# Patient Record
Sex: FEMALE | Race: WHITE | NOT HISPANIC OR LATINO | Employment: UNEMPLOYED | URBAN - METROPOLITAN AREA
[De-identification: names, ages, dates, MRNs, and addresses within clinical notes are randomized per-mention and may not be internally consistent; named-entity substitution may affect disease eponyms.]

---

## 2024-04-23 ENCOUNTER — HOSPITAL ENCOUNTER (EMERGENCY)
Facility: HOSPITAL | Age: 44
Discharge: HOME | End: 2024-04-23
Attending: EMERGENCY MEDICINE
Payer: COMMERCIAL

## 2024-04-23 ENCOUNTER — APPOINTMENT (EMERGENCY)
Dept: RADIOLOGY | Facility: HOSPITAL | Age: 44
End: 2024-04-23
Payer: COMMERCIAL

## 2024-04-23 VITALS
HEIGHT: 67 IN | DIASTOLIC BLOOD PRESSURE: 87 MMHG | RESPIRATION RATE: 18 BRPM | BODY MASS INDEX: 27.68 KG/M2 | SYSTOLIC BLOOD PRESSURE: 145 MMHG | WEIGHT: 176.37 LBS | OXYGEN SATURATION: 97 % | TEMPERATURE: 98.5 F | HEART RATE: 90 BPM

## 2024-04-23 DIAGNOSIS — T07.XXXA MULTIPLE ABRASIONS: ICD-10-CM

## 2024-04-23 DIAGNOSIS — M25.521 RIGHT ELBOW PAIN: ICD-10-CM

## 2024-04-23 DIAGNOSIS — S43.014A CLOSED ANTERIOR DISLOCATION OF RIGHT SHOULDER, INITIAL ENCOUNTER: Primary | ICD-10-CM
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
## 2024-04-23 PROCEDURE — 96372 THER/PROPH/DIAG INJ SC/IM: CPT

## 2024-04-23 PROCEDURE — 73020 X-RAY EXAM OF SHOULDER: CPT

## 2024-04-23 PROCEDURE — 99283 EMERGENCY DEPT VISIT LOW MDM: CPT

## 2024-04-23 PROCEDURE — 73030 X-RAY EXAM OF SHOULDER: CPT

## 2024-04-23 PROCEDURE — 73080 X-RAY EXAM OF ELBOW: CPT

## 2024-04-23 PROCEDURE — 23650 CLTX SHO DSLC W/MNPJ WO ANES: CPT | Performed by: EMERGENCY MEDICINE

## 2024-04-23 PROCEDURE — 99285 EMERGENCY DEPT VISIT HI MDM: CPT | Performed by: EMERGENCY MEDICINE

## 2024-04-23 RX ORDER — LORAZEPAM 1 MG/1
1 TABLET ORAL ONCE
Status: COMPLETED | OUTPATIENT
Start: 2024-04-23 | End: 2024-04-23

## 2024-04-23 RX ORDER — OXYCODONE HYDROCHLORIDE 5 MG/1
5 TABLET ORAL ONCE
Status: DISCONTINUED | OUTPATIENT
Start: 2024-04-23 | End: 2024-04-23

## 2024-04-23 RX ORDER — FENTANYL CITRATE 50 UG/ML
80 INJECTION, SOLUTION INTRAMUSCULAR; INTRAVENOUS ONCE
Status: DISCONTINUED | OUTPATIENT
Start: 2024-04-23 | End: 2024-04-23

## 2024-04-23 RX ORDER — ACETAMINOPHEN 325 MG/1
650 TABLET ORAL ONCE
Status: COMPLETED | OUTPATIENT
Start: 2024-04-23 | End: 2024-04-23

## 2024-04-23 RX ORDER — FENTANYL CITRATE 50 UG/ML
INJECTION, SOLUTION INTRAMUSCULAR; INTRAVENOUS ONCE
Status: CANCELLED | OUTPATIENT
Start: 2024-04-23 | End: 2024-04-23

## 2024-04-23 RX ORDER — IBUPROFEN 400 MG/1
400 TABLET ORAL ONCE
Status: COMPLETED | OUTPATIENT
Start: 2024-04-23 | End: 2024-04-23

## 2024-04-23 RX ORDER — FENTANYL CITRATE 50 UG/ML
80 INJECTION, SOLUTION INTRAMUSCULAR; INTRAVENOUS ONCE
Status: COMPLETED | OUTPATIENT
Start: 2024-04-23 | End: 2024-04-23

## 2024-04-23 RX ADMIN — FENTANYL CITRATE 80 MCG: 50 INJECTION INTRAMUSCULAR; INTRAVENOUS at 10:59

## 2024-04-23 RX ADMIN — IBUPROFEN 400 MG: 400 TABLET, FILM COATED ORAL at 09:21

## 2024-04-23 RX ADMIN — ACETAMINOPHEN 650 MG: 325 TABLET ORAL at 09:21

## 2024-04-23 RX ADMIN — LORAZEPAM 1 MG: 1 TABLET ORAL at 10:06

## 2024-04-23 NOTE — ED ATTENDING ATTESTATION
4/23/2024  I, Reggie Pierce MD, saw and evaluated the patient. I have discussed the patient with the resident/non-physician practitioner and agree with the resident's/non-physician practitioner's findings, Plan of Care, and MDM as documented in the resident's/non-physician practitioner's note, except where noted. All available labs and Radiology studies were reviewed.  I was present for key portions of any procedure(s) performed by the resident/non-physician practitioner and I was immediately available to provide assistance.       At this point I agree with the current assessment done in the Emergency Department.  I have conducted an independent evaluation of this patient a history and physical is as follows:    Patient is a 43-year-old female presents after mechanical fall with right shoulder right elbow pain.  Otherwise no complaints.  Patient with decreased range of motion significant tenderness to the right shoulder, x-rays reviewed and shows dislocation.  After 2 attempts of reducing the shoulder, we were eventually successful patient was placed in a sling.  Advised outpatient orthopedic follow-up.    ED Course         Critical Care Time  Procedures

## 2024-04-23 NOTE — DISCHARGE INSTRUCTIONS
Keep your right arm in a sling until you follow-up with the orthopedic office in approximately 1 week.

## 2024-04-23 NOTE — ED PROVIDER NOTES
History  Chief Complaint   Patient presents with    Fall     Landing on right side injuring right arm.   Complaints of shoulder, elbow, and wrist     43-year-old female presents with acute right shoulder and right elbow pain after mechanical fall from standing shortly prior to arrival.  Patient reports slipping on the gravel falling on her right side with her right arm outstretched.  Denies head strike or loss of consciousness.        None       History reviewed. No pertinent past medical history.    History reviewed. No pertinent surgical history.    History reviewed. No pertinent family history.  I have reviewed and agree with the history as documented.    E-Cigarette/Vaping     E-Cigarette/Vaping Substances     Social History     Tobacco Use    Smoking status: Never    Smokeless tobacco: Never   Substance Use Topics    Alcohol use: Never    Drug use: Never        Review of Systems   Constitutional:  Negative for chills and fever.   HENT:  Negative for ear pain and sore throat.    Eyes:  Negative for pain and visual disturbance.   Respiratory:  Negative for cough and shortness of breath.    Cardiovascular:  Negative for chest pain and palpitations.   Gastrointestinal:  Negative for abdominal pain and vomiting.   Genitourinary:  Negative for dysuria and hematuria.   Musculoskeletal:  Positive for arthralgias. Negative for back pain.   Skin:  Negative for color change and rash.   Neurological:  Negative for seizures, syncope and numbness.   All other systems reviewed and are negative.      Physical Exam  ED Triage Vitals   Temperature Pulse Respirations Blood Pressure SpO2   04/23/24 0914 04/23/24 0914 04/23/24 0914 04/23/24 0914 04/23/24 0914   98.5 °F (36.9 °C) 80 16 165/100 97 %      Temp Source Heart Rate Source Patient Position - Orthostatic VS BP Location FiO2 (%)   04/23/24 0914 04/23/24 0914 04/23/24 0914 04/23/24 0914 --   Tympanic Monitor Sitting Left arm       Pain Score       04/23/24 0912       9              Orthostatic Vital Signs  Vitals:    04/23/24 0914 04/23/24 0930 04/23/24 1101   BP: 165/100 154/92 145/87   Pulse: 80 92 90   Patient Position - Orthostatic VS: Sitting Sitting Sitting       Physical Exam  Vitals and nursing note reviewed.   Constitutional:       General: She is not in acute distress.     Appearance: Normal appearance. She is not ill-appearing, toxic-appearing or diaphoretic.   HENT:      Head: Normocephalic and atraumatic.      Right Ear: External ear normal.      Left Ear: External ear normal.      Nose: Nose normal.      Mouth/Throat:      Mouth: Mucous membranes are moist.      Pharynx: Oropharynx is clear.   Eyes:      General:         Right eye: No discharge.         Left eye: No discharge.      Extraocular Movements: Extraocular movements intact.      Conjunctiva/sclera: Conjunctivae normal.      Pupils: Pupils are equal, round, and reactive to light.   Cardiovascular:      Rate and Rhythm: Normal rate and regular rhythm.      Pulses: Normal pulses.      Heart sounds: Normal heart sounds. No murmur heard.     No friction rub.   Pulmonary:      Effort: Pulmonary effort is normal. No respiratory distress.      Breath sounds: Normal breath sounds. No wheezing or rales.   Abdominal:      General: Abdomen is flat. Bowel sounds are normal. There is no distension.      Palpations: Abdomen is soft.      Tenderness: There is no abdominal tenderness. There is no guarding.   Musculoskeletal:         General: Tenderness and deformity present. Normal range of motion.      Cervical back: Normal range of motion and neck supple.      Comments: Flattening of right deltoid contour.  No overlying skin changes.  Neurovascularly intact.   Skin:     General: Skin is warm and dry.      Capillary Refill: Capillary refill takes less than 2 seconds.      Coloration: Skin is not pale.   Neurological:      Mental Status: She is alert and oriented to person, place, and time.      Sensory: No sensory deficit.       "Motor: No weakness.   Psychiatric:         Mood and Affect: Mood normal.         Behavior: Behavior normal.         ED Medications  Medications   acetaminophen (TYLENOL) tablet 650 mg (650 mg Oral Given 4/23/24 0921)   ibuprofen (MOTRIN) tablet 400 mg (400 mg Oral Given 4/23/24 0921)   LORazepam (ATIVAN) tablet 1 mg (1 mg Oral Given 4/23/24 1006)   fentaNYL injection 80 mcg (80 mcg Intramuscular Given 4/23/24 1059)       Diagnostic Studies  Results Reviewed       None                   XR shoulder 1 vw RIGHT POST REDUCTION   Final Result by Glenroy Mazariegos MD (04/23 1446)   Anatomic alignment across the right glenohumeral joint after reduction.         Workstation performed: [id number]         XR shoulder 1 vw RIGHT POST REDUCTION   Final Result by Glenroy Mazariegos MD (04/23 1441)   Persistent right anterior shoulder dislocation without fracture.      Workstation performed: [id number]         XR shoulder 1 vw RIGHT POST REDUCTION   Final Result by Glenroy Mazariegos MD (04/23 1452)      Persistent anterior shoulder dislocation after attempted reduction.         Workstation performed: [id number]         XR shoulder 2+ views RIGHT   Final Result by Tyler Cannon MD (04/23 1046)      Anteroinferior dislocation of the right glenohumeral joint         Workstation performed: [id number]         XR elbow 3+ vw RIGHT   Final Result by Tyler Cannon MD (04/23 1047)      No acute osseous abnormality.      Workstation performed: [id number]               Procedures  Orthopedic injury treatment    Date/Time: 4/23/2024 11:13 AM    Performed by: Warren Hays MD  Authorized by: Warren Hays MD    Patient Location:  ED  [place] Protocol:  Procedure performed by: (Reggie Pierce MD)  Consent: Verbal consent obtained. Written consent not obtained.  Risks and benefits: risks, benefits and alternatives were discussed  Consent given by: patient  Time out: Immediately prior to procedure a \"time out\" was called to " verify the correct patient, procedure, equipment, support staff and site/side marked as required.  Timeout called at: 4/23/2024 11:14 AM.  Patient understanding: patient states understanding of the procedure being performed  Radiology Images displayed and confirmed. If images not available, report reviewed: imaging studies available  Required items: required blood products, implants, devices, and special equipment available  Patient identity confirmed: verbally with patient    Injury location:  Shoulder  Location details:  Right shoulder  Injury type:  Dislocation  Dislocation type: anterior    Chronicity:  New  Hill-Sachs deformity?: No    Neurovascular status: Neurovascularly intact    Distal perfusion: normal    Neurological function: normal    Range of motion: reduced    Local anesthesia used?: No    General anesthesia used?: No    Sedation type:  Anxiolysis  Manipulation performed?: Yes    Reduction method: external rotation, Milch technique, traction and counter traction  Skeletal traction used?: No    Reduction successful?: Yes    Confirmation: Reduction confirmed by x-ray    Immobilization:  Sling  Neurovascular status: Neurovascularly intact    Distal perfusion: normal    Neurological function: normal    Range of motion: improved    Patient tolerance:  Patient tolerated the procedure well with no immediate complications        ED Course                             SBIRT 20yo+      Flowsheet Row Most Recent Value   Initial Alcohol Screen: US AUDIT-C     1. How often do you have a drink containing alcohol? 0 Filed at: 04/23/2024 0917   2. How many drinks containing alcohol do you have on a typical day you are drinking?  0 Filed at: 04/23/2024 0917   3a. Male UNDER 65: How often do you have five or more drinks on one occasion? 0 Filed at: 04/23/2024 0917   3b. FEMALE Any Age, or MALE 65+: How often do you have 4 or more drinks on one occassion? 0 Filed at: 04/23/2024 0917   Audit-C Score 0 Filed at: 04/23/2024  0917   TEDDY: How many times in the past year have you...    Used an illegal drug or used a prescription medication for non-medical reasons? Never Filed at: 04/23/2024 0917                  Medical Decision Making  43-year-old female presents with right shoulder dislocation and right elbow pain.  No physical exam findings suggestive of neurovascular injury or head or neck trauma.  Order x-ray to assess for shoulder dislocation, humeral head fracture, elbow fracture.    Anterior/inferior shoulder dislocation evidenced on x-ray.  Reduced at bedside.  Follow-up with orthopedics.    Amount and/or Complexity of Data Reviewed  Radiology: ordered.    Risk  OTC drugs.  Prescription drug management.          Disposition  Final diagnoses:   Closed anterior dislocation of right shoulder, initial encounter   Fall from standing, initial encounter   Multiple abrasions   Right elbow pain     Time reflects when diagnosis was documented in both MDM as applicable and the Disposition within this note       Time User Action Codes Description Comment    4/23/2024 10:41 AM Warren Hays [S43.014A] Closed anterior dislocation of right shoulder, initial encounter     4/23/2024 10:41 AM Warren Hays [W19.XXXA] Fall from standing, initial encounter     4/23/2024 10:41 AM Warren Hays [T07.XXXA] Multiple abrasions     4/23/2024 10:42 AM Warren Hays [M25.521] Right elbow pain           ED Disposition       ED Disposition   Discharge    Condition   Stable    Date/Time   Tue Apr 23, 2024 1040    Comment   Maureen Hernandez discharge to home/self care.                   Follow-up Information       Follow up With Specialties Details Why Contact Info Additional Information    . [facility]   [address] [place] [id number] [phone] . [facility] [place] [address], [place], [place], [address]    [phone]            There are no discharge medications for this patient.        PDMP Review       None             ED Provider  Attending physically available and evaluated Maureen Hernandez. I managed the patient along with the ED Attending.    Electronically Signed by           Warren Hays MD  04/24/24 6079

## 2024-04-29 ENCOUNTER — OFFICE VISIT (OUTPATIENT)
Dept: OBGYN CLINIC | Facility: CLINIC | Age: 44
End: 2024-04-29

## 2024-04-29 VITALS
HEIGHT: 67 IN | HEART RATE: 75 BPM | SYSTOLIC BLOOD PRESSURE: 130 MMHG | DIASTOLIC BLOOD PRESSURE: 80 MMHG | BODY MASS INDEX: 31.71 KG/M2 | WEIGHT: 202 LBS

## 2024-04-29 DIAGNOSIS — S43.004A CLOSED DISLOCATION OF RIGHT SHOULDER, INITIAL ENCOUNTER: Primary | ICD-10-CM

## 2024-04-29 PROCEDURE — 99204 OFFICE O/P NEW MOD 45 MIN: CPT | Performed by: STUDENT IN AN ORGANIZED HEALTH CARE EDUCATION/TRAINING PROGRAM

## 2024-04-29 NOTE — PROGRESS NOTES
Ortho Sports Medicine Shoulder New Patient Visit     Assesment:   44 y.o. female with right shoulder pain after a dislocation 6 days ago that was successfully reduced in the emergency department with x-rays concerning for possible bony Bankart lesion    Plan:  I reviewed the history, exam, and imaging with the patient in clinic today.  I did review the patient's x-rays which does show possible defect in the anterior-inferior glenoid concerning for possible bony Bankart lesion.  Patient has remained in a sling since the shoulder reduction.  She is neurovascularly intact in the right shoulder.  I discussed that I would have her remain in the sling at this time.  I also recommend a CT to evaluate for possible glenoid fracture.  I discussed that if the CT is negative then we can start her working with physical therapy on gentle range of motion.  I did discuss that if she has persistent strength deficits that we could potentially get an MRI to evaluate for rotator cuff pathology.  Patient does not have insurance and is going to check to see how much CT cost before getting the imaging.  I recommended the patient follow-up after the CT to discuss results and further treatment options.  Patient demonstrated understanding discussion was agreed to plan.  All her questions were answered.  She will reach out to clinic with any question concerns anytime.    Conservative treatment:  Ice to shoulder 1-2 times daily, for 20 minutes at a time.  Continue with sling at all times  Over the counter Tylenol and anti-inflammatories as needed    Imaging:  All imaging from today was reviewed by myself and explained to the patient.   Order placed for right shoulder CT to evaluate for possible glenoid fracture.    Injection:  No Injection planned at this time.    Surgery:   No surgery is recommended at this point, continue with conservative treatment plan as noted.    Follow up:  Return for following CT.      Chief Complaint   Patient  presents with    Right Shoulder - Pain         History of Present Illness:  The patient is a 44 y.o. RHD female seen in clinic for right shoulder pain. The pain started 6 days ago. The mechanism of injury was slip and fall.  Patient states that she felt immediate pain in the shoulder.  She went to the emergency department where multiple reduction attempts were made under sedation before it was fully reduced.  She was placed in a sling and instructed to follow-up with orthopedics.  He has remained in the sling since the injury.  She denies any numbness or tingling in the right upper extremity.  She states that she is having 3 out of 10 pain on average at this point.  States the pain is interfering with her sleep.  She has only tried brace and Tylenol for pain control.  Symptoms have improved since the initial dislocation.  She denies any prior history of surgery on the right shoulder.    Shoulder Surgical History:  None    Past Medical, Social and Family History:  History reviewed. No pertinent past medical history.  History reviewed. No pertinent surgical history.  No Known Allergies  No current outpatient medications on file prior to visit.     No current facility-administered medications on file prior to visit.     Social History     Socioeconomic History    Marital status: /Civil Union     Spouse name: Not on file    Number of children: Not on file    Years of education: Not on file    Highest education level: Not on file   Occupational History    Not on file   Tobacco Use    Smoking status: Never    Smokeless tobacco: Never   Substance and Sexual Activity    Alcohol use: Never    Drug use: Never    Sexual activity: Not Currently   Other Topics Concern    Not on file   Social History Narrative    Not on file     Social Determinants of Health     Financial Resource Strain: Not on file   Food Insecurity: Not on file   Transportation Needs: Not on file   Physical Activity: Not on file   Stress: Not on file  "  Social Connections: Not on file   Intimate Partner Violence: Not on file   Housing Stability: Not on file       I have reviewed the past medical, surgical, social and family history, medications and allergies as documented in the EMR.    Review of systems: ROS is negative other than that noted in the HPI.  Constitutional: Negative for fatigue and fever.      Physical Exam:    Blood pressure 130/80, pulse 75, height 5' 7\" (1.702 m), weight 91.6 kg (202 lb).    General/Constitutional: NAD, well developed, well nourished  HENT: Normocephalic, atraumatic  CV: Intact distal pulses, regular rate  Resp: No respiratory distress or labored breathing  Neuro: Alert and Oriented x 3  Psych: Normal mood, normal affect, normal judgement, normal behavior  Skin: Warm, dry, no rashes, no erythema      Focused right shoulder exam:  No paracervical tenderness.   No cervical tenderness.   No pain with neck flexion, extension, side-to-side bending, or rotation.   Negative Spurling's bilaterally.    The skin is intact without evidence of erythema or ecchymosis.     Palpation demonstrates no tenderness over the SC joint, bilateral clavicle, lateral aspect of the acromion or posterior joint line, AC joint, or bicipital groove.    Shoulder range of motion and strength testing deferred due to recent dislocation.    Provocative testing of the shoulder was deferred due to recent dislocation.    UE NV Exam: +2 Radial pulses bilaterally. Fingers are warm and well-perfused.  Sensation intact to light touch C5-T1 bilaterally, Radial/median/ulnar nerve motor intact.  Has full sensation in the axillary nerve distribution and able to fire the deltoid.      Shoulder Imaging    Radiographs of the right shoulder were obtained on 4/23/2024 and reviewed with the patient.  Based on my independent evaluation, the imaging shows anterior-inferior dislocation of the shoulder with evidence of successful reduction.  There does appear to be a small defect in " the anterior-inferior glenoid that could represent a bony Bankart lesion.      Scribe Attestation      I,:   am acting as a scribe while in the presence of the attending physician.:       I,:   personally performed the services described in this documentation    as scribed in my presence.:

## 2024-04-30 ENCOUNTER — HOSPITAL ENCOUNTER (OUTPATIENT)
Dept: CT IMAGING | Facility: HOSPITAL | Age: 44
Discharge: HOME | End: 2024-04-30
Payer: COMMERCIAL

## 2024-04-30 DIAGNOSIS — S43.004A CLOSED DISLOCATION OF RIGHT SHOULDER, INITIAL ENCOUNTER: ICD-10-CM

## 2024-04-30 PROCEDURE — 73200 CT UPPER EXTREMITY W/O DYE: CPT

## 2024-05-02 ENCOUNTER — OFFICE VISIT (OUTPATIENT)
Dept: OBGYN CLINIC | Facility: CLINIC | Age: 44
End: 2024-05-02

## 2024-05-02 VITALS
BODY MASS INDEX: 31.48 KG/M2 | DIASTOLIC BLOOD PRESSURE: 73 MMHG | SYSTOLIC BLOOD PRESSURE: 149 MMHG | HEART RATE: 81 BPM | WEIGHT: 201 LBS

## 2024-05-02 DIAGNOSIS — S43.004A CLOSED DISLOCATION OF RIGHT SHOULDER, INITIAL ENCOUNTER: Primary | ICD-10-CM

## 2024-05-02 PROCEDURE — 99214 OFFICE O/P EST MOD 30 MIN: CPT | Performed by: STUDENT IN AN ORGANIZED HEALTH CARE EDUCATION/TRAINING PROGRAM

## 2024-05-03 NOTE — PROGRESS NOTES
Ortho Sports Medicine Shoulder New Patient Visit     Assesment:   44 y.o. female with right shoulder pain after a dislocation 6 days ago that was successfully reduced in the emergency department with CT showing a Hill-Sachs lesion but no glenoid fracture    Plan:  I reviewed the history, exam, and imaging with the patient in clinic today.  I did review the patient's CT which showed no evidence of a glenoid fracture a Hill-Sachs lesion in the humeral head which is expected given the traumatic anterior shoulder dislocation of the patient sustained.  Patient is approximately 2 weeks from the injury and has been compliant with wearing the sling at all times.  She has no history of instability events.  I discussed that at this point we can start physical therapy focusing on gentle range of motion exercises and advance to strengthening as tolerated.  I discussed that she could come out of the sling at this point to minimize any stiffness in the shoulder, elbow, or wrist.  I provided the patient with a prescription for physical therapy.  I recommended that she follow-up in 4 weeks for repeat evaluation.  Patient demonstrated understanding discussion was agreed to plan.  All her questions answered.  She will reach out to clinic with any question concerns anytime.    Conservative treatment:  Ice to shoulder 1-2 times daily, for 20 minutes at a time.  Discontinue sling  Over the counter Tylenol and anti-inflammatories as needed  PT prescription divided focusing on gentle shoulder range of motion exercises and progressing to strengthening    Imaging:  All imaging from today was reviewed by myself and explained to the patient.     Injection:  No Injection planned at this time.    Surgery:   No surgery is recommended at this point, continue with conservative treatment plan as noted.    Follow up:  Return in about 4 weeks (around 5/30/2024).      Chief Complaint   Patient presents with    Right Shoulder - Follow-up     Review CT  results         History of Present Illness:  The patient is a 44 y.o. RHD female following up in clinic for right shoulder pain.  At the patient's last visit, she was sent for a stat CT of the right shoulder to evaluate for possible glenoid fracture in the setting of a anterior shoulder dislocation requiring reduction in the emergency department.  Patient returns to review the results of the CT.  Patient states that she has been compliant with wearing the sling at all times.  She has not had any other instability events.  She denies any numbness or tingling in the right upper extremity.    Prior history:  The pain started 6 days ago. The mechanism of injury was slip and fall.  Patient states that she felt immediate pain in the shoulder.  She went to the emergency department where multiple reduction attempts were made under sedation before it was fully reduced.  She was placed in a sling and instructed to follow-up with orthopedics.  He has remained in the sling since the injury.  She denies any numbness or tingling in the right upper extremity.  She states that she is having 3 out of 10 pain on average at this point.  States the pain is interfering with her sleep.  She has only tried brace and Tylenol for pain control.  Symptoms have improved since the initial dislocation.  She denies any prior history of surgery on the right shoulder.    Shoulder Surgical History:  None    Past Medical, Social and Family History:  History reviewed. No pertinent past medical history.  History reviewed. No pertinent surgical history.  No Known Allergies  No current outpatient medications on file prior to visit.     No current facility-administered medications on file prior to visit.     Social History     Socioeconomic History    Marital status: /Civil Union     Spouse name: Not on file    Number of children: Not on file    Years of education: Not on file    Highest education level: Not on file   Occupational History    Not on  file   Tobacco Use    Smoking status: Never    Smokeless tobacco: Never   Substance and Sexual Activity    Alcohol use: Never    Drug use: Never    Sexual activity: Not Currently   Other Topics Concern    Not on file   Social History Narrative    Not on file     Social Determinants of Health     Financial Resource Strain: Not on file   Food Insecurity: Not on file   Transportation Needs: Not on file   Physical Activity: Not on file   Stress: Not on file   Social Connections: Not on file   Intimate Partner Violence: Not on file   Housing Stability: Not on file       I have reviewed the past medical, surgical, social and family history, medications and allergies as documented in the EMR.    Review of systems: ROS is negative other than that noted in the HPI.  Constitutional: Negative for fatigue and fever.      Physical Exam:    Blood pressure 149/73, pulse 81, weight 91.2 kg (201 lb).    General/Constitutional: NAD, well developed, well nourished  HENT: Normocephalic, atraumatic  CV: Intact distal pulses, regular rate  Resp: No respiratory distress or labored breathing  Neuro: Alert and Oriented x 3  Psych: Normal mood, normal affect, normal judgement, normal behavior  Skin: Warm, dry, no rashes, no erythema      Focused right shoulder exam:  No paracervical tenderness.   No cervical tenderness.   No pain with neck flexion, extension, side-to-side bending, or rotation.   Negative Spurling's bilaterally.    The skin is intact without evidence of erythema or ecchymosis.     Palpation demonstrates no tenderness over the SC joint, bilateral clavicle, lateral aspect of the acromion or posterior joint line, AC joint, or bicipital groove.    Shoulder range of motion and strength testing deferred due to recent dislocation.    Provocative testing of the shoulder was deferred due to recent dislocation.    UE NV Exam: +2 Radial pulses bilaterally. Fingers are warm and well-perfused.  Sensation intact to light touch C5-T1  bilaterally, Radial/median/ulnar nerve motor intact.  Has full sensation in the axillary nerve distribution and able to fire the deltoid.      Shoulder Imaging    Radiographs of the right shoulder were obtained on 4/23/2024 and reviewed with the patient.  Based on my independent evaluation, the imaging shows anterior-inferior dislocation of the shoulder with evidence of successful reduction.  There does appear to be a small defect in the anterior-inferior glenoid that could represent a bony Bankart lesion.    CT of the right shoulder was obtained on 4/30/2024 and reviewed with the patient.  The imaging shows a Hill-Sachs lesion in the humeral head but no evidence of a glenoid fracture.  Humeral head is well centered on the glenoid.      Scribe Attestation      I,:   am acting as a scribe while in the presence of the attending physician.:       I,:   personally performed the services described in this documentation    as scribed in my presence.: